# Patient Record
Sex: FEMALE | Race: WHITE | NOT HISPANIC OR LATINO | Employment: FULL TIME | URBAN - METROPOLITAN AREA
[De-identification: names, ages, dates, MRNs, and addresses within clinical notes are randomized per-mention and may not be internally consistent; named-entity substitution may affect disease eponyms.]

---

## 2019-04-16 ENCOUNTER — THERAPY VISIT (OUTPATIENT)
Dept: PHYSICAL THERAPY | Facility: CLINIC | Age: 49
End: 2019-04-16
Payer: COMMERCIAL

## 2019-04-16 DIAGNOSIS — M25.551 HIP PAIN, RIGHT: ICD-10-CM

## 2019-04-16 PROCEDURE — 97140 MANUAL THERAPY 1/> REGIONS: CPT | Mod: GP | Performed by: PHYSICAL THERAPIST

## 2019-04-16 PROCEDURE — 97112 NEUROMUSCULAR REEDUCATION: CPT | Mod: GP | Performed by: PHYSICAL THERAPIST

## 2019-04-16 PROCEDURE — 97161 PT EVAL LOW COMPLEX 20 MIN: CPT | Mod: GP | Performed by: PHYSICAL THERAPIST

## 2019-04-16 ASSESSMENT — ACTIVITIES OF DAILY LIVING (ADL)
GETTING_INTO_AND_OUT_OF_A_BATHTUB: NO DIFFICULTY AT ALL
RECREATIONAL_ACTIVITIES: NO DIFFICULTY AT ALL
PUTTING_ON_SOCKS_AND_SHOES: NO DIFFICULTY AT ALL
ROLLING_OVER_IN_BED: NO DIFFICULTY AT ALL
DEEP_SQUATTING: NO DIFFICULTY AT ALL
LIGHT_TO_MODERATE_WORK: NO DIFFICULTY AT ALL
GETTING_INTO_AND_OUT_OF_AN_AVERAGE_CAR: NO DIFFICULTY AT ALL
HEAVY_WORK: NO DIFFICULTY AT ALL
WALKING_APPROXIMATELY_10_MINUTES: NO DIFFICULTY AT ALL
STEPPING_UP_AND_DOWN_CURBS: NO DIFFICULTY AT ALL
GOING_UP_1_FLIGHT_OF_STAIRS: NO DIFFICULTY AT ALL
SITTING_FOR_15_MINUTES: NO DIFFICULTY AT ALL
HOS_ADL_COUNT: 17
HOS_ADL_HIGHEST_POTENTIAL_SCORE: 68
WALKING_DOWN_STEEP_HILLS: NO DIFFICULTY AT ALL
TWISTING/PIVOTING_ON_INVOLVED_LEG: NO DIFFICULTY AT ALL
HOS_ADL_ITEM_SCORE_TOTAL: 68
WALKING_UP_STEEP_HILLS: NO DIFFICULTY AT ALL
WALKING_15_MINUTES_OR_GREATER: NO DIFFICULTY AT ALL
GOING_DOWN_1_FLIGHT_OF_STAIRS: NO DIFFICULTY AT ALL
HOS_ADL_SCORE(%): 100
WALKING_INITIALLY: NO DIFFICULTY AT ALL
STANDING_FOR_15_MINUTES: NO DIFFICULTY AT ALL
HOW_WOULD_YOU_RATE_YOUR_CURRENT_LEVEL_OF_FUNCTION_DURING_YOUR_USUAL_ACTIVITIES_OF_DAILY_LIVING_FROM_0_TO_100_WITH_100_BEING_YOUR_LEVEL_OF_FUNCTION_PRIOR_TO_YOUR_HIP_PROBLEM_AND_0_BEING_THE_INABILITY_TO_PERFORM_ANY_OF_YOUR_USUAL_DAILY_ACTIVITIES?: 100

## 2019-04-16 NOTE — PROGRESS NOTES
[facility] Initial Evaluation  Subjective:  The history is provided by the patient. No  was used.   Chanda Brewer is a 48 year old female with a right hip (March 2019) condition.  Occurance: running.  Condition occurred: during recreation/sport.  This is a recurrent condition     Site of Pain: lateral right pain.  Radiates to:  No radiation.  Pain is described as aching and is intermittent and reported as 5/10.  Associated with: none. Worse during: none.  Exacerbated by: running. and relieved by ice, heat and NSAID's.  Since onset symptoms are gradually improving.  Special testing: none.  Previous treatment: none.  Improvement with previous treatment: na.  General health as reported by patient is good.                                              Objective:        Flexibility/Screens:   Negative screens: Lumbar                                                      Hip Evaluation  HIP AROM:    Flexion: Left: 125    Right:  115    Extension: Left: 15    Right:  5  Abduction: Left:  Wnl    Right:  Wnl    Adduction: Left: wnl    Right: wnl  Internal Rotation: Left: 20    Right: 15  External Rotation: Left: 30    Right: 28  Knee Flexion:  Left: wnl    Right: wnl  Knee Extension: Left: wnl    Right: wnl    Hip Strength:    Flexion:   Left: 5/5   Pain:  Right: 5-/5   Pain:                    Extension:  Left: 5/5  Pain:Right: 4+/5    Pain:    Abduction:  Left: 4+/5     Pain:Right: 4/5    Pain:  Adduction:  Left: 4+/5    Pain:  Internal Rotation:  Left: 5/5    Pain:Right: 5/5   Pain:  External Rotation:  Left: 5/5   Pain:  Right: 5/5   Pain:  Knee Flexion:  Left: 5/5   Pain:Right: 5-/5   Pain:  Knee Extension:  Left: 5/5   Pain:Right: 5/5    Pain:        Hip Special Testing:   Not Assessed        Hip Palpation:  Palpations normal left hip: right TFL tightness.      Functional Testing:  not assessed                    Forward bend test positive sitting, posterior psis right, FRS R  L3-5, faulty abdominal recruitment pattern with compensatory sucking in of lower abdominal muscles and bulging of upper abdominals   General     ROS     Assessment/Plan:    Patient is a 48 year old female with right side hip complaints.    Patient has the following significant findings with corresponding treatment plan.                Diagnosis 1:   Right hip pain   Pain -  hot/cold therapy, manual therapy, self management, education, directional preference exercise and home program  Decreased ROM/flexibility - manual therapy, therapeutic exercise, therapeutic activity and home program  Impaired muscle performance - neuro re-education and home program  Decreased function - therapeutic activities and home program  Instability -  Therapeutic Activity  Therapeutic Exercise  Neuromuscular Re-education  home program    Therapy Evaluation Codes:     Low complexity.    Previous and current functional limitations:  (See Goal Flow Sheet for this information)    Short term and Long term goals: (See Goal Flow Sheet for this information)     Communication ability:  Patient appears to be able to clearly communicate and understand verbal and written communication and follow directions correctly.  Treatment Explanation - The following has been discussed with the patient:   RX ordered/plan of care  Anticipated outcomes  Possible risks and side effects  This patient would benefit from PT intervention to resume normal activities.   Rehab potential is good.    Frequency:  1 X week, once daily  Duration:  for 6 weeks  Discharge Plan:  Achieve all LTG.  Independent in home treatment program.  Reach maximal therapeutic benefit.    Please refer to the daily flowsheet for treatment today, total treatment time and time spent performing 1:1 timed codes.

## 2019-04-24 ENCOUNTER — THERAPY VISIT (OUTPATIENT)
Dept: PHYSICAL THERAPY | Facility: CLINIC | Age: 49
End: 2019-04-24
Payer: COMMERCIAL

## 2019-04-24 DIAGNOSIS — M25.551 HIP PAIN, RIGHT: ICD-10-CM

## 2019-04-24 PROCEDURE — 97140 MANUAL THERAPY 1/> REGIONS: CPT | Mod: GP | Performed by: PHYSICAL THERAPIST

## 2019-04-24 PROCEDURE — 97112 NEUROMUSCULAR REEDUCATION: CPT | Mod: GP | Performed by: PHYSICAL THERAPIST

## 2019-05-21 ENCOUNTER — THERAPY VISIT (OUTPATIENT)
Dept: PHYSICAL THERAPY | Facility: CLINIC | Age: 49
End: 2019-05-21
Payer: COMMERCIAL

## 2019-05-21 DIAGNOSIS — M25.551 HIP PAIN, RIGHT: ICD-10-CM

## 2019-05-21 PROCEDURE — 97112 NEUROMUSCULAR REEDUCATION: CPT | Mod: GP | Performed by: PHYSICAL THERAPIST

## 2019-05-21 PROCEDURE — 97110 THERAPEUTIC EXERCISES: CPT | Mod: GP | Performed by: PHYSICAL THERAPIST

## 2019-05-21 PROCEDURE — 97140 MANUAL THERAPY 1/> REGIONS: CPT | Mod: GP | Performed by: PHYSICAL THERAPIST

## 2019-05-21 ASSESSMENT — ACTIVITIES OF DAILY LIVING (ADL)
WALKING_DOWN_STEEP_HILLS: NO DIFFICULTY AT ALL
HOS_ADL_COUNT: 17
HOS_ADL_HIGHEST_POTENTIAL_SCORE: 68
STEPPING_UP_AND_DOWN_CURBS: NO DIFFICULTY AT ALL
RECREATIONAL_ACTIVITIES: NO DIFFICULTY AT ALL
GETTING_INTO_AND_OUT_OF_AN_AVERAGE_CAR: NO DIFFICULTY AT ALL
HEAVY_WORK: NO DIFFICULTY AT ALL
HOS_ADL_SCORE(%): 100
WALKING_UP_STEEP_HILLS: NO DIFFICULTY AT ALL
DEEP_SQUATTING: NO DIFFICULTY AT ALL
ROLLING_OVER_IN_BED: NO DIFFICULTY AT ALL
TWISTING/PIVOTING_ON_INVOLVED_LEG: NO DIFFICULTY AT ALL
WALKING_15_MINUTES_OR_GREATER: NO DIFFICULTY AT ALL
GOING_UP_1_FLIGHT_OF_STAIRS: NO DIFFICULTY AT ALL
WALKING_INITIALLY: NO DIFFICULTY AT ALL
HOS_ADL_ITEM_SCORE_TOTAL: 68
GOING_DOWN_1_FLIGHT_OF_STAIRS: NO DIFFICULTY AT ALL
WALKING_APPROXIMATELY_10_MINUTES: NO DIFFICULTY AT ALL
LIGHT_TO_MODERATE_WORK: NO DIFFICULTY AT ALL
GETTING_INTO_AND_OUT_OF_A_BATHTUB: NO DIFFICULTY AT ALL
SITTING_FOR_15_MINUTES: NO DIFFICULTY AT ALL
PUTTING_ON_SOCKS_AND_SHOES: NO DIFFICULTY AT ALL
STANDING_FOR_15_MINUTES: NO DIFFICULTY AT ALL

## 2019-05-30 ENCOUNTER — THERAPY VISIT (OUTPATIENT)
Dept: PHYSICAL THERAPY | Facility: CLINIC | Age: 49
End: 2019-05-30
Payer: COMMERCIAL

## 2019-05-30 DIAGNOSIS — M25.551 HIP PAIN, RIGHT: ICD-10-CM

## 2019-05-30 PROCEDURE — 97140 MANUAL THERAPY 1/> REGIONS: CPT | Mod: GP | Performed by: PHYSICAL THERAPIST

## 2019-06-05 ENCOUNTER — THERAPY VISIT (OUTPATIENT)
Dept: PHYSICAL THERAPY | Facility: CLINIC | Age: 49
End: 2019-06-05
Payer: COMMERCIAL

## 2019-06-05 DIAGNOSIS — M25.551 HIP PAIN, RIGHT: ICD-10-CM

## 2019-06-05 PROCEDURE — 97140 MANUAL THERAPY 1/> REGIONS: CPT | Mod: GP | Performed by: PHYSICAL THERAPIST

## 2019-06-05 PROCEDURE — 97110 THERAPEUTIC EXERCISES: CPT | Mod: GP | Performed by: PHYSICAL THERAPIST

## 2019-06-07 ENCOUNTER — THERAPY VISIT (OUTPATIENT)
Dept: PHYSICAL THERAPY | Facility: CLINIC | Age: 49
End: 2019-06-07
Payer: COMMERCIAL

## 2019-06-07 DIAGNOSIS — M25.551 HIP PAIN, RIGHT: ICD-10-CM

## 2019-06-07 PROCEDURE — 97110 THERAPEUTIC EXERCISES: CPT | Mod: GP | Performed by: PHYSICAL THERAPIST

## 2019-06-07 PROCEDURE — 97530 THERAPEUTIC ACTIVITIES: CPT | Mod: GP | Performed by: PHYSICAL THERAPIST

## 2019-06-07 ASSESSMENT — ACTIVITIES OF DAILY LIVING (ADL)
HOS_ADL_ITEM_SCORE_TOTAL: 68
TWISTING/PIVOTING_ON_INVOLVED_LEG: NO DIFFICULTY AT ALL
SITTING_FOR_15_MINUTES: NO DIFFICULTY AT ALL
GOING_DOWN_1_FLIGHT_OF_STAIRS: NO DIFFICULTY AT ALL
HOS_ADL_SCORE(%): 100
WALKING_UP_STEEP_HILLS: NO DIFFICULTY AT ALL
LIGHT_TO_MODERATE_WORK: NO DIFFICULTY AT ALL
WALKING_INITIALLY: NO DIFFICULTY AT ALL
STEPPING_UP_AND_DOWN_CURBS: NO DIFFICULTY AT ALL
HEAVY_WORK: NO DIFFICULTY AT ALL
GETTING_INTO_AND_OUT_OF_AN_AVERAGE_CAR: NO DIFFICULTY AT ALL
HOW_WOULD_YOU_RATE_YOUR_CURRENT_LEVEL_OF_FUNCTION_DURING_YOUR_USUAL_ACTIVITIES_OF_DAILY_LIVING_FROM_0_TO_100_WITH_100_BEING_YOUR_LEVEL_OF_FUNCTION_PRIOR_TO_YOUR_HIP_PROBLEM_AND_0_BEING_THE_INABILITY_TO_PERFORM_ANY_OF_YOUR_USUAL_DAILY_ACTIVITIES?: 100
RECREATIONAL_ACTIVITIES: NO DIFFICULTY AT ALL
ROLLING_OVER_IN_BED: NO DIFFICULTY AT ALL
PUTTING_ON_SOCKS_AND_SHOES: NO DIFFICULTY AT ALL
WALKING_APPROXIMATELY_10_MINUTES: NO DIFFICULTY AT ALL
WALKING_DOWN_STEEP_HILLS: NO DIFFICULTY AT ALL
DEEP_SQUATTING: NO DIFFICULTY AT ALL
HOS_ADL_HIGHEST_POTENTIAL_SCORE: 68
WALKING_15_MINUTES_OR_GREATER: NO DIFFICULTY AT ALL
HOS_ADL_COUNT: 17
GETTING_INTO_AND_OUT_OF_A_BATHTUB: NO DIFFICULTY AT ALL
STANDING_FOR_15_MINUTES: NO DIFFICULTY AT ALL
GOING_UP_1_FLIGHT_OF_STAIRS: NO DIFFICULTY AT ALL
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
## 2019-06-18 ENCOUNTER — THERAPY VISIT (OUTPATIENT)
Dept: PHYSICAL THERAPY | Facility: CLINIC | Age: 49
End: 2019-06-18
Payer: COMMERCIAL

## 2019-06-18 DIAGNOSIS — M25.551 HIP PAIN, RIGHT: ICD-10-CM

## 2019-06-18 PROCEDURE — 97112 NEUROMUSCULAR REEDUCATION: CPT | Mod: GP | Performed by: PHYSICAL THERAPIST

## 2019-06-18 PROCEDURE — 97140 MANUAL THERAPY 1/> REGIONS: CPT | Mod: GP | Performed by: PHYSICAL THERAPIST

## 2019-07-16 ENCOUNTER — THERAPY VISIT (OUTPATIENT)
Dept: PHYSICAL THERAPY | Facility: CLINIC | Age: 49
End: 2019-07-16
Payer: COMMERCIAL

## 2019-07-16 DIAGNOSIS — M25.551 HIP PAIN, RIGHT: ICD-10-CM

## 2019-07-16 PROCEDURE — 97112 NEUROMUSCULAR REEDUCATION: CPT | Mod: GP | Performed by: PHYSICAL THERAPIST

## 2019-07-16 PROCEDURE — 97140 MANUAL THERAPY 1/> REGIONS: CPT | Mod: GP | Performed by: PHYSICAL THERAPIST

## 2019-07-16 ASSESSMENT — ACTIVITIES OF DAILY LIVING (ADL)
WALKING_APPROXIMATELY_10_MINUTES: NO DIFFICULTY AT ALL
HEAVY_WORK: NO DIFFICULTY AT ALL
LIGHT_TO_MODERATE_WORK: NO DIFFICULTY AT ALL
WALKING_15_MINUTES_OR_GREATER: NO DIFFICULTY AT ALL
TWISTING/PIVOTING_ON_INVOLVED_LEG: NO DIFFICULTY AT ALL
RECREATIONAL_ACTIVITIES: NO DIFFICULTY AT ALL
GETTING_INTO_AND_OUT_OF_A_BATHTUB: NO DIFFICULTY AT ALL
ROLLING_OVER_IN_BED: NO DIFFICULTY AT ALL
WALKING_INITIALLY: NO DIFFICULTY AT ALL
HOW_WOULD_YOU_RATE_YOUR_CURRENT_LEVEL_OF_FUNCTION_DURING_YOUR_USUAL_ACTIVITIES_OF_DAILY_LIVING_FROM_0_TO_100_WITH_100_BEING_YOUR_LEVEL_OF_FUNCTION_PRIOR_TO_YOUR_HIP_PROBLEM_AND_0_BEING_THE_INABILITY_TO_PERFORM_ANY_OF_YOUR_USUAL_DAILY_ACTIVITIES?: 100
SITTING_FOR_15_MINUTES: NO DIFFICULTY AT ALL

## 2019-07-16 NOTE — PROGRESS NOTES
Subjective:  HPI                    Objective:  System    Physical Exam    General     ROS    Assessment/Plan:    DISCHARGE REPORT    Progress reporting period is from 4- to July 26, 2019.       SUBJECTIVE  Subjective changes noted by patient:  Overall improvement has been significant and no pain with running. She feels comfortable continuing with independent home program.  Subjective: See DC    Current pain level is 0/10    Initial Pain level: 5/10.   Changes in function:  Yes (See Goal flowsheet attached for changes in current functional level)  Adverse reaction to treatment or activity: None    OBJECTIVE  Changes noted in objective findings:  Hip Evaluation  HIP AROM:    Flexion: Left: 125    Right:  125     Extension: Left: 15    Right: 15  Abduction: Left:  Wnl    Right:  Wnl     Adduction: Left: wnl    Right: wnl  Internal Rotation: Left: 20    Right: 20  External Rotation: Left: 30    Right: 30  Knee Flexion:  Left: wnl    Right: wnl  Knee Extension: Left: wnl    Right: wnl    Hip Strength:    Flexion:   Left: 5/5   Pain:  Right: 5-/5   Pain:                    Extension:  Left: 5/5  Pain:Right: 5/5    Pain:    Abduction:  Left: 5/5     Pain:Right: 5/5    Pain:  Adduction:  Left:5/5    Pain:  Internal Rotation:  Left: 5/5    Pain:Right: 5/5   Pain:  External Rotation:  Left: 5/5   Pain:  Right: 5/5   Pain:  Knee Flexion:  Left: 5/5   Pain:Right: 5/5   Pain:  Knee Extension:  Left: 5/5   Pain:Right: 5/5    Pain:      Hip Palpation:  Palpations normal left hip: right mild to moderate decreased in right  TFL tightness.   Forward bend test positive sitting, posterior psis right, mild right on left backward sacral torsion,  Mild FRS R L3-5,  Normal abdominal recruitment pattern, good right lower extremity control with right forward lunge.    ASSESSMENT/PLAN  Updated problem list and treatment plan: Right hip pain   Pain -  hot/cold therapy, manual therapy, self management, education, directional preference  exercise and home program  Decreased ROM/flexibility - manual therapy, therapeutic exercise, therapeutic activity and home program  Impaired muscle performance - neuro re-education and home program  Decreased function - therapeutic activities and home program  Instability -  Therapeutic Activity  Therapeutic Exercise  Neuromuscular Re-education  home program  STG/LTGs have been met or progress has been made towards goals:  Yes (See Goal flow sheet completed today.)  Assessment of Progress: The patient has met all of their long term goals.  Self Management Plans:  Patient has been instructed in a home treatment program.  Patient is independent in a home treatment program.  Patient  has been instructed in self management of symptoms.  Patient is independent in self management of symptoms.  I have re-evaluated this patient and find that the nature, scope, duration and intensity of the therapy is appropriate for the medical condition of the patient.  Chanda continues to require the following intervention to meet STG and LTG's:  PT intervention is no longer required to meet STG/LTG.    Recommendations:  This patient is ready to be discharged from therapy and continue their home treatment program.    Please refer to the daily flowsheet for treatment today, total treatment time and time spent performing 1:1 timed codes.

## 2019-07-16 NOTE — LETTER
[facility]  [address] [address]  [phone]    2019    Re: Chanda NUNEZ Daniella   :   1970  MRN:  [id number]   REFERRING PHYSICIAN:   Ena Cabello    [facility]  Date of Initial Evaluation: 2019  Visits:  Rxs Used: 8  Reason for Referral:  Hip pain, right    EVALUATION SUMMARY    Assessment/Plan:    DISCHARGE REPORT  Progress reporting period is from 2019 to 2019.       SUBJECTIVE  Subjective changes noted by patient:  Overall improvement has been significant and no pain with running. She feels comfortable continuing with independent home program.  Subjective: See DC    Current pain level is 0/10    Initial Pain level: 5/10.   Changes in function:  Yes (See Goal flowsheet attached for changes in current functional level)  Adverse reaction to treatment or activity: None    OBJECTIVE  Changes noted in objective findings:      Hip Evaluation  HIP AROM:    Flexion: Left: 125    Right:  1 25  Extension: Left: 15    Right:  15  Abduction: Left:  Wnl    Right:  Wnl  Adduction: Left: wnl    Right: wnl  Internal Rotation: Left: 20    Right:  20  External Rotation: Left: 30    Right:  30  Knee Flexion:  Left: wnl    Right: wnl  Knee Extension: Left: wnl    Right: wnl      Hip Strength:    Flexion:   Left: 5/5   Pain:  Right: 5-/5                 Extension:  Left: 5/5  Pain:Right:  5/5     Abduction:  Left:  5/5     Pain:Right:  5/5     Adduction:  Left: 5/5    Pain:  Internal Rotation:  Left: 5/5    Pain:Right: 5/5     External Rotation:  Left: 5/5   Pain:  Right: 5/5     Re: Chanda Brewer   :   1970    Knee Flexion:  Left: 5/5   Pain:Right: 5/5     Knee Extension:  Left: 5/5   Pain:Right: 5/5         Hip Palpation:  Palpations normal left hip: right  mild to moderate decreased in right  TFL tightness.   Forward bend test positive sitting, posterior psis right, mild right on left backward  sacral torsion,  Mild FRS R L3-5,  Normal abdominal recruitment pattern, good right lower extremity control with right forward lunge.    ASSESSMENT/PLAN  Updated problem list and treatment plan: Right hip pain   Pain -  hot/cold therapy, manual therapy, self management, education, directional preference exercise and home program  Decreased ROM/flexibility - manual therapy, therapeutic exercise, therapeutic activity and home program  Impaired muscle performance - neuro re-education and home program  Decreased function - therapeutic activities and home program  Instability -  Therapeutic Activity  Therapeutic Exercise  Neuromuscular Re-education  home program  STG/LTGs have been met or progress has been made towards goals:  Yes (See Goal flow sheet completed today.)  Assessment of Progress: The patient has met all of their long term goals.  Self Management Plans:  Patient has been instructed in a home treatment program.  Patient is independent in a home treatment program.  Patient  has been instructed in self management of symptoms.  Patient is independent in self management of symptoms.  I have re-evaluated this patient and find that the nature, scope, duration and intensity of the therapy is appropriate for the medical condition of the patient.  Chanda continues to require the following intervention to meet STG and LTG's:  PT intervention is no longer required to meet STG/LTG.    Recommendations:  This patient is ready to be discharged from therapy and continue their home treatment program.    Thank you for your referral.    INQUIRIES  Therapist: Piedad Keen, PT  [facility] [address] [address]  Phone: [phone]  Fax: [phone]

## 2019-10-03 ENCOUNTER — HEALTH MAINTENANCE LETTER (OUTPATIENT)
Age: 49
End: 2019-10-03

## 2020-11-07 ENCOUNTER — HEALTH MAINTENANCE LETTER (OUTPATIENT)
Age: 50
End: 2020-11-07

## 2021-03-27 ENCOUNTER — HEALTH MAINTENANCE LETTER (OUTPATIENT)
Age: 51
End: 2021-03-27

## 2021-09-05 ENCOUNTER — HEALTH MAINTENANCE LETTER (OUTPATIENT)
Age: 51
End: 2021-09-05

## 2021-12-26 ENCOUNTER — HEALTH MAINTENANCE LETTER (OUTPATIENT)
Age: 51
End: 2021-12-26

## 2022-04-17 ENCOUNTER — HEALTH MAINTENANCE LETTER (OUTPATIENT)
Age: 52
End: 2022-04-17

## 2022-10-29 ENCOUNTER — HEALTH MAINTENANCE LETTER (OUTPATIENT)
Age: 52
End: 2022-10-29

## 2023-04-02 ENCOUNTER — HEALTH MAINTENANCE LETTER (OUTPATIENT)
Age: 53
End: 2023-04-02

## 2023-06-01 ENCOUNTER — HEALTH MAINTENANCE LETTER (OUTPATIENT)
Age: 53
End: 2023-06-01

## 2024-06-08 ENCOUNTER — HEALTH MAINTENANCE LETTER (OUTPATIENT)
Age: 54
End: 2024-06-08